# Patient Record
Sex: MALE | Race: WHITE | URBAN - METROPOLITAN AREA
[De-identification: names, ages, dates, MRNs, and addresses within clinical notes are randomized per-mention and may not be internally consistent; named-entity substitution may affect disease eponyms.]

---

## 2021-02-12 ENCOUNTER — TELEPHONE (OUTPATIENT)
Dept: PHYSICAL THERAPY | Age: 36
End: 2021-02-12

## 2021-02-16 ENCOUNTER — TELEPHONE (OUTPATIENT)
Dept: ORTHOPEDIC SURGERY | Age: 36
End: 2021-02-16

## 2021-02-16 ENCOUNTER — EVALUATION (OUTPATIENT)
Dept: PHYSICAL THERAPY | Age: 36
End: 2021-02-16
Payer: COMMERCIAL

## 2021-02-16 DIAGNOSIS — S46.911A RIGHT SHOULDER STRAIN, INITIAL ENCOUNTER: Primary | ICD-10-CM

## 2021-02-16 PROCEDURE — 97140 MANUAL THERAPY 1/> REGIONS: CPT | Performed by: PHYSICAL THERAPIST

## 2021-02-16 PROCEDURE — 97161 PT EVAL LOW COMPLEX 20 MIN: CPT | Performed by: PHYSICAL THERAPIST

## 2021-02-16 PROCEDURE — 97110 THERAPEUTIC EXERCISES: CPT | Performed by: PHYSICAL THERAPIST

## 2021-02-16 NOTE — TELEPHONE ENCOUNTER
I received a  Voice message from  patient stating that his POR told him that he had a appointment with Physical Therapy. On message patient stated that he could not be found on the sheet and that his information could not be found. I contacted therapy and below this is what Merari Tejada told her.     patient left message saying that he was told that he had an PT eval today and they couldn't find him on the schedule and he didn't have an appointment.  I called and talked to him and told him he had an appointment for PT eval today 2/12/21 at 11:00 and he said he knew nothing about it. [facility] is in [facility] and unable to make his appointment. Unk Rust back into town on 2/15/21.  Rescheduled for 2/16/2021.      Patient could not of been in the building for therapy because he stated that he was in [facility].

## 2021-02-16 NOTE — PROGRESS NOTES
shoulder. Palpation: Tender to palpation mid deltoid, distal RTC attachment     Joint/Motion:  Right Shoulder:  AROM: 155° Forward elevation,  WNL° ER,  IR to 40  PROM: 160° Forward elevation,  WNL° ER,  45° IR    Left Shoulder:  AROM: WNL° Forward elevation,  WNL° ER,  IR to WNL  PROM: WNL° Forward elevation,  WNL° ER , WNL° IR     Strength:  Right Shoulder: Flexion 4/5,  Abduction 4/5, ER 3+/5, IR 4-/5      Left Shoulder: Flexion 5/5,  Abduction 5/5, ER 5/5, IR 5/5       Special Tests/Functional Screens:    [] Jailene []+ / [] -  [] [place]'s []+ / [] -   []  Caden's []+ / [] -    []GH drawer []+ / [] -    [] Bicep Load []+ / [] -   [] Crank []+ / [] -  [] [facility] []+ / [] -   [] Elbow Varus []+ / [] -  [] Neer's []+ / [] -      [] Speed's []+ / [] -   []Sulcus Sign []+ / [] -   [] Apprehension []+ / [] -   [] Bicep Load II []+ / [] -   [] Oletta Perone []+ / [] -   [] Elbow Valgus []+ / [] -     [] Other: []+ / [] -         ASSESSMENT     Outcome Measure:   QuickDASH (Disorders of the Arm, Shoulder, and Hand) 45% disability    Problems:    Pain reported 8/10   ROM decreased   Strength decreased RUE Flexion 4/5,  Abduction 4/5, ER 3+/5, IR 4-/5     Decreased functional ability with work, ADLs, use of right upper extremity, reaching, lifting, carrying, driving    [x] There are no barriers affecting plan of care or recovery    [] Barriers to this patient's plan of care or recovery include. Domestic Concerns:  [x] No  [] Yes:    Short Term goals (2-3 weeks)   Decrease reported pain to 5/10   Increase ROM to [facility]   Increase Strength to Flexion 4+/5,  Abduction 4+/5, ER 4/5, IR 4/5      Able to perform/complete the following functions/tasks: Perform ADLs, hobbies, job duties with less pain.     QuickDASH (Disorders of the Arm, Shoulder, and Hand) 30% disability    Long Term goals (4-6 weeks)   Decrease reported pain to 0-3/10   Increase ROM to WNL   Increase Strength to Flexion, Abduction Only     Please sign Physician's Certification and return to: [address] [address]  Dept: [phone]  Dept Fax: [phone] Certification / Comments     Frequency/Duration 1-2 days per week for 4-6 weeks. Certification period from 2/16/2021  to 5/15/21. I have reviewed the Plan of Care established for skilled therapy services and certify that the services are required and that they will be provided while the patient is under my care.     Physician's Comments/Revisions:               Physician's Printed Name:                                           [de-identified] Signature:                                                               Date:

## 2021-02-16 NOTE — PROGRESS NOTES
Physical Therapy Daily Treatment Note    Date: 2021  Patient Name: Birgit Barraza  : 1985   MRN: <[id number]>  DOInjury: 10/15/20  DOSx: None   Referring Provider: Deyvi Francis MD  [id number] [facility]  [facility] [address]     Medical Diagnosis:      Diagnosis Orders   1. Right shoulder strain, initial encounter         Outcome Measure: Quick Dash: 45% Impairment    S: See eval  O: Pt given written HEP  Time 3583-9409     Visit  WC Repeat outcome measure at mid point and end. Pain 4/10     /160 Flex, 40/45 IR     Modalities            Manual 10 mins flex, scap, IR, ER                 Stretch      Table slides      Wall Walk Flex      Wall Walk ABD      Wall Pec/ER Stretch      Wall ER Stretch      Towel IR Stretch      Supine Stretch with Arms Behind Head            Exercise      UBE          Pulleys - Flex      Pulleys - IR      Supine Wand Flex 15 reps x 1 set      Supine Wand Bench Press 15 reps x 1 set     Supine Wand ER/IR 15 reps x 1 set     Standing Wand IR 15 reps x 1 set     Standing Wand Scaption 15 reps x 1 set     Standing Wand Flex      Standing Wand ER/IR      Shrugs AROM       Pendulum Ex            Supine Flex      S-lying ABD      S-lying ER            Prone Flexion      Prone Ext      Prone Row with ER      Prone Horizontal ABD            Standing Flex      Standing ER/IR      Standing Scaption       Standing ABD      Standing Shoulder Press       Functional activities To aid in ROM and strength needed for reaching , lifting ,pushing and pulling at home/work    ROWS: H       ROWS: M  \"    ROWS: L  \"    ER  \"    IR  \"    Punches      Shoulder Press      Shoulder Flex      Shoulder ABD            Weighted Machines      Lat Pull Down      Vertical Row      A:  Tolerated well. Above added to written HEP.   P: Continue with rehab plan  Shirin Breen PT    Treatment Charges: Mins Units   Initial Evaluation 25 1   Re-Evaluation     Ther Exercise         TE 15 1   Manual Therapy MT 10 1   Ther Activities        TA     Gait Training          GT     Neuro Re-education NR     Modalities     Non-Billable Service Time     Other     Total Time/Units 50 3

## 2021-02-23 ENCOUNTER — TREATMENT (OUTPATIENT)
Dept: PHYSICAL THERAPY | Age: 36
End: 2021-02-23
Payer: COMMERCIAL

## 2021-02-23 DIAGNOSIS — S46.911A RIGHT SHOULDER STRAIN, INITIAL ENCOUNTER: Primary | ICD-10-CM

## 2021-02-23 PROCEDURE — 97110 THERAPEUTIC EXERCISES: CPT | Performed by: PHYSICAL THERAPIST

## 2021-02-23 NOTE — PROGRESS NOTES
Physical Therapy Daily Treatment Note    Date: 2021  Patient Name: Xenia Dozier  : 1985   MRN: <[id number]>  DOInjury: 10/15/20  DOSx: None   Referring Provider: No referring provider defined for this encounter. Medical Diagnosis:      Diagnosis Orders   1. Right shoulder strain, initial encounter         Outcome Measure: Quick Dash: 45% Impairment    S: Pt stated that he was very sore following the first session but that he has been performing his HEP 1-2x since Saturday and has felt mild-moderate pain and stiffness but not as bad as the first session. O: Pt given written HEP  Time 945-1030     Visit  WC Repeat outcome measure at mid point and end.     Pain 4/10     /160 Flex, 40/45 IR     Modalities            Manual                  Stretch      Table slides      Wall Walk Flex 10 reps x 1 set with 10s hold     Wall Walk ABD 10 reps x 1 set with 10s hold     Wall Pec/ER Stretch 10 reps x 1 set with 10s hold     Wall ER Stretch      Towel IR Stretch      Supine Stretch with Arms Behind Head            Exercise      UBE     3 mins fwd, 3 mins bwd     Pulleys - Flex 4 mins     Pulleys - IR 4 mins     Supine Wand Flex     Supine Wand Bench Press     Supine Wand ER/IR     Standing Wand IR 15 reps x 1 set     Standing Wand Scaption      Standing Wand Flex NEXT     Standing Wand ER/IR NEXT     Shrugs AROM       Pendulum Ex            Supine Flex      S-lying ABD      S-lying ER            Prone Flexion      Prone Ext      Prone Row with ER      Prone Horizontal ABD            Standing Flex      Standing ER/IR      Standing Scaption       Standing ABD      Standing Shoulder Press       Functional activities To aid in ROM and strength needed for reaching , lifting ,pushing and pulling at home/work    ROWS: H NEXT      ROWS: M NEXT \"    ROWS: L NEXT \"    ER NEXT \"    IR NEXT \"    Punches NEXT     Shoulder Press      Shoulder Flex      Shoulder ABD            Weighted Machines      Lat Pull Down Vertical Row      A:  Tolerated well. Pt tolerated additional stretching exercises well and was encouraged to perform the new exercises along with the wand exercises at home.    P: Continue with rehab plan  Oli Jacobs PT    Treatment Charges: Mins Units   Initial Evaluation     Re-Evaluation     Ther Exercise         TE 45 3   Manual Therapy     MT     Ther Activities        TA     Gait Training          GT     Neuro Re-education NR     Modalities     Non-Billable Service Time     Other     Total Time/Units 45 3

## 2021-02-25 ENCOUNTER — TREATMENT (OUTPATIENT)
Dept: PHYSICAL THERAPY | Age: 36
End: 2021-02-25
Payer: COMMERCIAL

## 2021-02-25 DIAGNOSIS — S46.911A RIGHT SHOULDER STRAIN, INITIAL ENCOUNTER: Primary | ICD-10-CM

## 2021-02-25 PROCEDURE — 97110 THERAPEUTIC EXERCISES: CPT | Performed by: PHYSICAL THERAPIST

## 2021-02-25 NOTE — PROGRESS NOTES
Physical Therapy Daily Treatment Note    Date: 2021  Patient Name: Bia Tavarez  : 1985   MRN: <[id number]>  DOInjury: 10/15/20  DOSx: None   Referring Provider: No referring provider defined for this encounter. Medical Diagnosis:      Diagnosis Orders   1. Right shoulder strain, initial encounter         Outcome Measure: Quick Dash: 45% Impairment    S: Pt stated that he was very sore following the first session but that he has been performing his HEP 1-2x since Saturday and has felt mild-moderate pain and stiffness but not as bad as the first session. O: Pt given written HEP  Time 945-1030     Visit 3/9 WC Repeat outcome measure at mid point and end.     Pain 4/10     /160 Flex, 40/45 IR     Modalities            Manual                  Stretch      Table slides      Wall Walk Flex     Wall Walk ABD     Wall Pec/ER Stretch     Wall ER Stretch      Towel IR Stretch      Supine Stretch with Arms Behind Head            Exercise      UBE     3 mins fwd, 3 mins bwd     Pulleys - Flex 4 mins     Pulleys - IR 4 mins     Supine Wand Flex     Supine Wand Bench Press     Supine Wand ER/IR     Standing Wand IR 15 reps x 1 set     Standing Wand Scaption      Standing Wand Flex NEXT     Standing Wand ER/IR NEXT     Shrugs AROM       Pendulum Ex            Supine Flex NEXT     S-lying ABD NEXT     S-lying ER NEXT           Prone Flexion NEXT     Prone Ext NEXT     Prone Row with ER NEXT     Prone Horizontal ABD NEXT           Standing Flex      Standing ER/IR      Standing Scaption       Standing ABD      Standing Shoulder Press       Functional activities To aid in ROM and strength needed for reaching , lifting ,pushing and pulling at home/work    ROWS: H 20 reps x 1 set BLUE      ROWS: M 20 reps x 1 set BLUE \"    ROWS: L 20 reps x 1 set BLUE \"    ER 20 reps x 1 set BLUE \"    IR 20 reps x 1 set BLUE 1 band    Punches 20 reps x 1 set BLUE     Shoulder Press      Shoulder Flex      Shoulder ABD Weighted Machines      Lat Pull Down      Vertical Row      A:  Tolerated well. Pt tolerated additional stretching exercises well and was encouraged to perform the new exercises along with the wand exercises at home.    P: Continue with rehab plan  Charlotte Friend PT    Treatment Charges: Mins Units   Initial Evaluation     Re-Evaluation     Ther Exercise         TE 45 3   Manual Therapy     MT     Ther Activities        TA     Gait Training          GT     Neuro Re-education NR     Modalities     Non-Billable Service Time     Other     Total Time/Units 45 3

## 2021-03-02 ENCOUNTER — TREATMENT (OUTPATIENT)
Dept: PHYSICAL THERAPY | Age: 36
End: 2021-03-02
Payer: COMMERCIAL

## 2021-03-02 DIAGNOSIS — S46.911A RIGHT SHOULDER STRAIN, INITIAL ENCOUNTER: Primary | ICD-10-CM

## 2021-03-02 PROCEDURE — 97110 THERAPEUTIC EXERCISES: CPT | Performed by: PHYSICAL THERAPIST

## 2021-03-02 NOTE — PROGRESS NOTES
Physical Therapy Daily Treatment Note    Date: 3/2/2021  Patient Name: Dhiraj Gold  : 1985   MRN: <[id number]>  DOInjury: 10/15/20  DOSx: None   Referring Provider: No referring provider defined for this encounter. Medical Diagnosis:      Diagnosis Orders   1. Right shoulder strain, initial encounter         Outcome Measure: Quick Dash: 45% Impairment    S: Pt stated that he felt pretty good throughout session and that his pain level throughout the day is diminishing. O: Pt given written HEP  Time 7689-9382     Visit  WC Repeat outcome measure at mid point and end. Pain 4/10     /160 Flex, 40/45 IR     Modalities            Manual                  Stretch      Table slides      Wall Walk Flex     Wall Walk ABD     Wall Pec/ER Stretch     Wall ER Stretch      Towel IR Stretch      Supine Stretch with Arms Behind Head            Exercise      UBE          Pulleys - Flex 3 mins     Pulleys - IR 3 mins     Supine Wand Flex     Supine Wand Bench Press     Supine Wand ER/IR     Standing Wand IR 15 reps x 1 set     Standing Wand Scaption      Standing Wand Flex 15 reps x 1 set      Standing Wand ER/IR 15 reps x 1 set      Shrugs AROM       Pendulum Ex            Supine Flex 2# 15 reps x 1 set      S-lying ABD 2# 15 reps x 1 set      S-lying ER 2# 15 reps x 1 set            Prone Flexion 2# 15 reps x 1 set      Prone Ext 2# 15 reps x 1 set      Prone Row with ER 2# 15 reps x 1 set      Prone Horizontal ABD 2# 15 reps x 1 set            Standing Flex NEXT     Standing ER/IR NEXT     Standing Scaption  NEXT     Standing ABD NEXT     Standing Shoulder Press NEXT      Functional activities To aid in ROM and strength needed for reaching , lifting ,pushing and pulling at home/work    ROWS: H      ROWS: M \"    ROWS: L \"    ER \"    IR 1 band    Punches     Shoulder Press      Shoulder Flex      Shoulder ABD            Weighted Machines      Lat Pull Down      Vertical Row      A:  Tolerated well.   Pt to perform wand exercises 1x per day and weighted exercises 1x per day as well. Tolerated weighted exercises well without increase in symptoms.    P: Continue with rehab plan  Sindi Elly, PT    Treatment Charges: Mins Units   Initial Evaluation     Re-Evaluation     Ther Exercise         TE 45 3   Manual Therapy     MT     Ther Activities        TA     Gait Training          GT     Neuro Re-education NR     Modalities     Non-Billable Service Time     Other     Total Time/Units 45 3

## 2021-03-04 ENCOUNTER — TREATMENT (OUTPATIENT)
Dept: PHYSICAL THERAPY | Age: 36
End: 2021-03-04
Payer: COMMERCIAL

## 2021-03-04 DIAGNOSIS — S46.911A RIGHT SHOULDER STRAIN, INITIAL ENCOUNTER: Primary | ICD-10-CM

## 2021-03-04 PROCEDURE — 97110 THERAPEUTIC EXERCISES: CPT | Performed by: PHYSICAL THERAPIST

## 2021-03-04 NOTE — PROGRESS NOTES
Physical Therapy Daily Treatment Note    Date: 3/4/2021  Patient Name: Shea Mccabe  \"Erik\"  : 1985   MRN: <[id number]>  DOInjury: 10/15/20  DOSx: None   Referring Provider: No referring provider defined for this encounter. Medical Diagnosis:      Diagnosis Orders   1. Right shoulder strain, initial encounter         Outcome Measure: Quick Dash: 45% Impairment    S: Pt stated that he felt pretty good throughout session and that his pain level throughout the day is diminishing. Pt did state that he woke up sorer than normal but that currently it is a dull ache and his pain has lessened. O: Pt given written HEP  Time 7641-1690     Visit  WC Repeat outcome measure at mid point and end.     Pain 4/10     /160 Flex, 40/45 IR     Modalities            Manual                  Stretch      Table slides      Wall Walk Flex 5 reps x 1 set with 15s holds    Wall Walk ABD 5 reps x 1 set with 15s holds    Wall Pec/ER Stretch     Wall ER Stretch      Towel IR Stretch      Standing Stretch with Arms Behind Head 5 reps x 1 set with 15s holds     Arm Cross Body Stretch 5 reps x 1 set with 15s holds     Exercise      UBE          Pulleys - Flex 4 mins     Pulleys - IR 4 mins     Supine Wand Flex     Supine Wand Bench Press     Supine Wand ER/IR     Standing Wand IR     Standing Wand Scaption     Standing Wand Flex     Standing Wand ER/IR     Shrugs AROM       Pendulum Ex           Supine Flex     S-lying ABD     S-lying ER          Prone Flexion     Prone Ext     Prone Row with ER     Prone Horizontal ABD           Standing Flex 3# 15 reps x 1 set  Add weight next    Standing ER/IR 3# 15 reps x 1 set  Add weight next    Standing Scaption  3# 15 reps x 1 set  Add weight next    Standing ABD 3# 15 reps x 1 set  Add weight next    Standing Shoulder Press 3# 15 reps x 1 set  Add weight next     Functional activities To aid in ROM and strength needed for reaching , lifting ,pushing and pulling at home/work    ROWS: H NEXT WEIGHT    ROWS: M NEXT WEIGHT    ROWS: L NEXT WEIGHT    ER \"    IR 1 band    Punches NEXT WEIGHT    Shoulder Press      Shoulder Flex      Shoulder ABD            Weighted Machines      Lat Pull Down NEXT NEXT    Vertical Row NEXT NEXT    A:  Tolerated well. Pt to perform wand exercises 1x per day and weighted exercises 1x per day as well. Tolerated weighted exercises well without increase in symptoms.    P: Continue with rehab plan  Cande Che, PT    Treatment Charges: Mins Units   Initial Evaluation     Re-Evaluation     Ther Exercise         TE 45 3   Manual Therapy     MT     Ther Activities        TA     Gait Training          GT     Neuro Re-education NR     Modalities     Non-Billable Service Time     Other     Total Time/Units 45 3

## 2021-03-09 ENCOUNTER — TREATMENT (OUTPATIENT)
Dept: PHYSICAL THERAPY | Age: 36
End: 2021-03-09
Payer: COMMERCIAL

## 2021-03-09 DIAGNOSIS — S46.911A RIGHT SHOULDER STRAIN, INITIAL ENCOUNTER: Primary | ICD-10-CM

## 2021-03-09 PROCEDURE — 97110 THERAPEUTIC EXERCISES: CPT | Performed by: PHYSICAL THERAPIST

## 2021-03-09 NOTE — PROGRESS NOTES
Physical Therapy Daily Treatment Note    Date: 3/9/2021  Patient Name: Ebenezer Roman  \"Erik\"  : 1985   MRN: <[id number]>  DOInjury: 10/15/20  DOSx: None   Referring Provider: No referring provider defined for this encounter. Medical Diagnosis:      Diagnosis Orders   1. Right shoulder strain, initial encounter         Outcome Measure: Quick Dash: 45% Impairment    S: Pt stated that he feels like he is 50% better since start of therapy but there is still weakness and some pain although it has lessened since the start. O: Pt given written HEP  Time 3439-5826     Visit  WC Repeat outcome measure at mid point and end.     Pain 4/10     /160 Flex, 40/45 IR     Modalities            Manual                  Stretch      Table slides      Wall Walk Flex 5 reps x 1 set with 15s holds    Wall Walk ABD 5 reps x 1 set with 15s holds    Wall Pec/ER Stretch     Wall ER Stretch      Towel IR Stretch      Standing Stretch with Arms Behind Head 5 reps x 1 set with 15s holds     Arm Cross Body Stretch 5 reps x 1 set with 15s holds     Exercise      UBE     3 mins fwd, 3 mins bwd     Pulleys - Flex NEXT    Pulleys - IR NEXT    Supine Wand Flex     Supine Wand Bench Press     Supine Wand ER/IR     Standing Wand IR     Standing Wand Scaption     Standing Wand Flex     Standing Wand ER/IR     Shrugs AROM       Pendulum Ex           Supine Flex     S-lying ABD     S-lying ER          Prone Flexion     Prone Ext     Prone Row with ER     Prone Horizontal ABD           Standing Flex 4# 20 reps x 1 set  NEXT Decrease reps, increase weight    Standing ER/IR 4# 20 reps x 1 set  NEXT Decrease reps, increase weight    Standing Scaption  4# 20 reps x 1 set  NEXT Decrease reps, increase weight    Standing ABD 4# 20 reps x 1 set  NEXT Decrease reps, increase weight    Standing Shoulder Press 4# 15 reps x 1 set  NEXT Decrease reps, increase weight     Functional activities To aid in ROM and strength needed for reaching , lifting ,pushing and pulling at home/work    ROWS: H 25# 20 reps x 1 set Increase next    ROWS: M 25# 20 reps x 1 set Increase next    ROWS: L 25# 20 reps x 1 set Increase next    ER 5# 10 reps x 1 set     IR 5# 20 reps x 1 set      Punches      Shoulder Press      Shoulder Flex      Shoulder ABD            Weighted Machines      Lat Pull Down 55# 20 reps x 1 set  Increase next    Vertical Row 55# 20 reps x 1 set  Increase next    A:  Tolerated well. Pt to perform wand exercises 1x per day and weighted exercises 1x per day as well. Tolerated increase in weighted exercises well without increase in symptoms.    P: Continue with rehab plan  Saul Gibbs PT    Treatment Charges: Mins Units   Initial Evaluation     Re-Evaluation     Ther Exercise         TE 45 3   Manual Therapy     MT     Ther Activities        TA     Gait Training          GT     Neuro Re-education NR     Modalities     Non-Billable Service Time     Other     Total Time/Units 45 3

## 2021-03-11 ENCOUNTER — TREATMENT (OUTPATIENT)
Dept: PHYSICAL THERAPY | Age: 36
End: 2021-03-11
Payer: COMMERCIAL

## 2021-03-11 DIAGNOSIS — S46.911A RIGHT SHOULDER STRAIN, INITIAL ENCOUNTER: Primary | ICD-10-CM

## 2021-03-11 PROCEDURE — 97110 THERAPEUTIC EXERCISES: CPT | Performed by: PHYSICAL THERAPIST

## 2021-03-11 NOTE — PROGRESS NOTES
Physical Therapy Daily Treatment Note    Date: 3/11/2021  Patient Name: Nakul Ambrosio  \"Erik\"  : 1985   MRN: <[id number]>  DOInjury: 10/15/20  DOSx: None   Referring Provider: No referring provider defined for this encounter. Medical Diagnosis:      Diagnosis Orders   1. Right shoulder strain, initial encounter         Outcome Measure: Quick Dash: 45% Impairment    S: Pt stated that he felt sore yesterday but nothing changed otherwise regarding exercises or activity level but that his pain level is back to baseline today. O: Pt given written HEP  Time 7222-2767     Visit  WC Repeat outcome measure at mid point and end.     Pain 2/10     /160 Flex, 40/45 IR     Modalities            Manual                  Stretch      Table slides      Wall Walk Flex 5 reps x 1 set with 15s holds    Wall Walk ABD 5 reps x 1 set with 15s holds    Wall Pec/ER Stretch     Wall ER Stretch      Towel IR Stretch      Standing Stretch with Arms Behind Head 5 reps x 1 set with 15s holds     Arm Cross Body Stretch 5 reps x 1 set with 15s holds     Exercise      UBE     3 mins fwd, 3 mins bwd     Pulleys - Flex 3 mins     Pulleys - IR 3 mins     Supine Wand Flex     Supine Wand Bench Press     Supine Wand ER/IR     Standing Wand IR     Standing Wand Scaption     Standing Wand Flex     Standing Wand ER/IR     Shrugs AROM       Pendulum Ex      Shoulder Flexion Tapping Ball on Wall  3 mins with 3 short breaks during    [employer] on Wall, up/down, side/side, cwise, ccwise 3 mins    Supine Flex     S-lying ABD     S-lying ER          Prone Flexion     Prone Ext     Prone Row with ER     Prone Horizontal ABD           Standing Flex 6# 12 reps x 1 set      Standing ER 6# 12 reps x 1 set      Standing IR 6# 12 reps x 1 set      Standing Scaption  6# 12 reps x 1 set      Standing ABD 6# 12 reps x 1 set      Standing Shoulder Press 6# 15 reps x 1 set       Functional activities To aid in ROM and strength needed for reaching , lifting ,pushing and pulling at home/work    ROWS: H 25# 20 reps x 1 set Increase next    ROWS: M 25# 20 reps x 1 set Increase next    ROWS: L 25# 20 reps x 1 set Increase next    ER 5# 10 reps x 1 set     IR 5# 20 reps x 1 set      Punches      Shoulder Press      Shoulder Flex      Shoulder ABD            Weighted Machines      Lat Pull Down 85# 7 reps x 1 set  Increase next    Vertical Row 85# 12 reps x 1 set  Increase next    A:  Tolerated well. Tolerated increase in weighted exercises well without increase in symptoms.    P: Continue with rehab plan  Charlotte Friend PT    Treatment Charges: Mins Units   Initial Evaluation     Re-Evaluation     Ther Exercise         TE 45 3   Manual Therapy     MT     Ther Activities        TA     Gait Training          GT     Neuro Re-education NR     Modalities     Non-Billable Service Time     Other     Total Time/Units 45 3

## 2021-03-17 ENCOUNTER — TREATMENT (OUTPATIENT)
Dept: PHYSICAL THERAPY | Age: 36
End: 2021-03-17
Payer: COMMERCIAL

## 2021-03-17 DIAGNOSIS — S46.911A RIGHT SHOULDER STRAIN, INITIAL ENCOUNTER: Primary | ICD-10-CM

## 2021-03-17 PROCEDURE — 97110 THERAPEUTIC EXERCISES: CPT | Performed by: PHYSICAL THERAPIST

## 2021-03-17 NOTE — PROGRESS NOTES
Physical Therapy Daily Treatment Note    Date: 3/17/2021  Patient Name: Marie Records  \"Erik\"  : 1985   MRN: <[id number]>  DOInjury: 10/15/20  DOSx: None   Referring Provider: No referring provider defined for this encounter. Medical Diagnosis:      Diagnosis Orders   1. Right shoulder strain, initial encounter         Outcome Measure: Quick Dash: 45% Impairment    S: Pt stated that he performs his HEP 2x a day usually. O: Pt given written HEP  Time 7909-9255     Visit  WC Repeat outcome measure at mid point and end.     Pain 5/10     /160 Flex, 40/45 IR     Modalities            Manual                  Stretch      Table slides      Wall Walk Flex 5 reps x 1 set with 15s holds    Wall Walk ABD 5 reps x 1 set with 15s holds    Wall Pec/ER Stretch     Wall ER Stretch      Towel IR Stretch      Standing Stretch with Arms Behind Head 5 reps x 1 set with 15s holds     Arm Cross Body Stretch 5 reps x 1 set with 15s holds     Exercise      UBE          Pulleys - Flex 3 mins     Pulleys - IR 3 mins     Supine Wand Flex     Supine Wand Bench Press     Supine Wand ER/IR     Standing Wand IR     Standing Wand Scaption     Standing Wand Flex     Standing Wand ER/IR     Shrugs AROM       Pendulum Ex      Shoulder Flexion Tapping Ball on Wall  3 mins with 3 short breaks during    [employer] on Wall, up/down, side/side, cwise, ccwise 3 mins    Supine Flex     S-lying ABD     S-lying ER          Prone Flexion     Prone Ext     Prone Row with ER     Prone Horizontal ABD           Standing Flex 6# 12 reps x 1 set      Standing ER 6# 12 reps x 1 set      Standing IR 6# 12 reps x 1 set      Standing Scaption  6# 12 reps x 1 set      Standing ABD 6# 12 reps x 1 set      Standing Shoulder Press 6# 15 reps x 1 set       Functional activities To aid in ROM and strength needed for reaching , lifting ,pushing and pulling at home/work    ROWS: H 35# 15 reps x 1 set     ROWS: M 35# 15 reps x 1 set     ROWS: L 35# 15 reps x 1 set     ER 5# 15 reps x 1 set     IR 10# 15 reps x 1 set      Punches      Shoulder Press      Shoulder Flex      Shoulder ABD            Weighted Machines      Lat Pull Down 85# 12 reps x 1 set      Vertical Row 85# 15 reps x 1 set      A:  Tolerated well. Tolerated increase in weighted exercises well without increase in symptoms.    P: Continue with rehab plan  Karolina Fang PT    Treatment Charges: Mins Units   Initial Evaluation     Re-Evaluation     Ther Exercise         TE 40 3   Manual Therapy     MT     Ther Activities        TA     Gait Training          GT     Neuro Re-education NR     Modalities     Non-Billable Service Time     Other     Total Time/Units 40 3

## 2021-03-19 ENCOUNTER — TREATMENT (OUTPATIENT)
Dept: PHYSICAL THERAPY | Age: 36
End: 2021-03-19
Payer: COMMERCIAL

## 2021-03-19 DIAGNOSIS — S46.911A RIGHT SHOULDER STRAIN, INITIAL ENCOUNTER: Primary | ICD-10-CM

## 2021-03-19 PROCEDURE — 97110 THERAPEUTIC EXERCISES: CPT | Performed by: PHYSICAL THERAPIST

## 2023-10-23 ENCOUNTER — HOSPITAL ENCOUNTER (EMERGENCY)
Age: 38
Discharge: HOME | End: 2023-10-23
Attending: EMERGENCY MEDICINE
Payer: COMMERCIAL

## 2023-10-23 VITALS
DIASTOLIC BLOOD PRESSURE: 108 MMHG | RESPIRATION RATE: 18 BRPM | WEIGHT: 250 LBS | TEMPERATURE: 97.6 F | BODY MASS INDEX: 33.86 KG/M2 | SYSTOLIC BLOOD PRESSURE: 174 MMHG | HEART RATE: 96 BPM | HEIGHT: 72 IN | OXYGEN SATURATION: 98 %

## 2023-10-23 DIAGNOSIS — K64.4 EXTERNAL HEMORRHOID, BLEEDING: Primary | ICD-10-CM

## 2023-10-23 PROCEDURE — 99283 EMERGENCY DEPT VISIT LOW MDM: CPT

## 2023-10-23 RX ORDER — DOCUSATE SODIUM 100 MG/1
100 CAPSULE, LIQUID FILLED ORAL 2 TIMES DAILY PRN
Qty: 20 CAPSULE | Refills: 0 | Status: CANCELLED | OUTPATIENT
Start: 2023-10-23 | End: 2023-11-02

## 2023-10-23 RX ORDER — HYDROCORTISONE ACETATE 25 MG/1
25 SUPPOSITORY RECTAL 2 TIMES DAILY
Qty: 20 SUPPOSITORY | Refills: 0 | Status: SHIPPED | OUTPATIENT
Start: 2023-10-23

## 2023-10-23 RX ORDER — DOCUSATE SODIUM 100 MG/1
100 CAPSULE, LIQUID FILLED ORAL 2 TIMES DAILY PRN
Qty: 20 CAPSULE | Refills: 0 | Status: SHIPPED | OUTPATIENT
Start: 2023-10-23 | End: 2023-11-02

## 2023-10-23 ASSESSMENT — ENCOUNTER SYMPTOMS
NAUSEA: 0
SHORTNESS OF BREATH: 0
DIARRHEA: 0
ANAL BLEEDING: 1
VOMITING: 0
CONSTIPATION: 1
BLOOD IN STOOL: 0
ABDOMINAL PAIN: 0

## 2023-10-23 ASSESSMENT — LIFESTYLE VARIABLES
HOW OFTEN DO YOU HAVE A DRINK CONTAINING ALCOHOL: NEVER
HOW MANY STANDARD DRINKS CONTAINING ALCOHOL DO YOU HAVE ON A TYPICAL DAY: PATIENT DOES NOT DRINK

## 2023-10-23 NOTE — DISCHARGE INSTRUCTIONS
Three times a day, sit in a bath with Epsom salt and warm water for 10 minutes. Use a stool softener and avoid constipation. If symptoms worsen, return to the ER and follow up with general surgeon.

## 2023-10-23 NOTE — ED PROVIDER NOTES
importance of follow-up. New Prescriptions    DOCUSATE SODIUM (COLACE) 100 MG CAPSULE    Take 1 capsule by mouth 2 times daily as needed for Constipation    HYDROCORTISONE (ANUSOL-HC) 25 MG SUPPOSITORY    Place 1 suppository rectally 2 times daily       Diagnosis:  1. External hemorrhoid, bleeding        Disposition:  Patient's disposition: Discharge to home  Patient's condition is stable.          Jodi Kenney, DO  10/23/23 4027